# Patient Record
Sex: MALE | Race: BLACK OR AFRICAN AMERICAN | NOT HISPANIC OR LATINO | URBAN - METROPOLITAN AREA
[De-identification: names, ages, dates, MRNs, and addresses within clinical notes are randomized per-mention and may not be internally consistent; named-entity substitution may affect disease eponyms.]

---

## 2018-04-18 PROBLEM — 91175000 SEIZURE: Status: ACTIVE | Noted: 2018-04-18

## 2021-01-29 ENCOUNTER — OFFICE VISIT (OUTPATIENT)
Dept: URBAN - METROPOLITAN AREA CLINIC 13 | Facility: CLINIC | Age: 41
End: 2021-01-29

## 2021-02-05 ENCOUNTER — OFFICE VISIT (OUTPATIENT)
Dept: URBAN - METROPOLITAN AREA CLINIC 46 | Facility: CLINIC | Age: 41
End: 2021-02-05

## 2021-08-28 ENCOUNTER — TELEPHONE ENCOUNTER (OUTPATIENT)
Dept: URBAN - METROPOLITAN AREA CLINIC 13 | Facility: CLINIC | Age: 41
End: 2021-08-28

## 2021-08-28 RX ORDER — EXTENDED PHENYTOIN SODIUM 30 MG/1
CAPSULE ORAL
OUTPATIENT
End: 2019-07-09

## 2021-08-28 RX ORDER — LORAZEPAM 0.5 MG/1
TABLET ORAL
OUTPATIENT
End: 2019-07-09

## 2021-08-28 RX ORDER — LORAZEPAM 2 MG/ML
CONCENTRATE ORAL
OUTPATIENT
End: 2021-01-29

## 2021-08-29 ENCOUNTER — TELEPHONE ENCOUNTER (OUTPATIENT)
Dept: URBAN - METROPOLITAN AREA CLINIC 13 | Facility: CLINIC | Age: 41
End: 2021-08-29

## 2021-08-29 RX ORDER — LEVETIRACETAM 100 MG/ML
SOLUTION ORAL
Status: ACTIVE | COMMUNITY

## 2021-08-29 RX ORDER — MAGNESIUM OXIDE 500 MG
TABLET ORAL
Status: ACTIVE | COMMUNITY

## 2023-03-03 ENCOUNTER — OFFICE VISIT (OUTPATIENT)
Dept: URBAN - METROPOLITAN AREA CLINIC 46 | Facility: CLINIC | Age: 43
End: 2023-03-03
Payer: MEDICAID

## 2023-03-03 ENCOUNTER — TELEPHONE ENCOUNTER (OUTPATIENT)
Dept: URBAN - METROPOLITAN AREA CLINIC 46 | Facility: CLINIC | Age: 43
End: 2023-03-03

## 2023-03-03 VITALS
DIASTOLIC BLOOD PRESSURE: 99 MMHG | WEIGHT: 240 LBS | TEMPERATURE: 98.3 F | SYSTOLIC BLOOD PRESSURE: 137 MMHG | BODY MASS INDEX: 30.8 KG/M2 | OXYGEN SATURATION: 98 % | HEIGHT: 74 IN | HEART RATE: 90 BPM

## 2023-03-03 DIAGNOSIS — Z93.1 GASTROINTESTINAL TUBE IN SITU: ICD-10-CM

## 2023-03-03 PROBLEM — 440419004: Status: ACTIVE | Noted: 2023-03-03

## 2023-03-03 PROCEDURE — 99204 OFFICE O/P NEW MOD 45 MIN: CPT | Performed by: INTERNAL MEDICINE

## 2023-03-03 RX ORDER — MAGNESIUM OXIDE 500 MG
1 TABLET 30 MINUTES BEFORE BEDTIME AS NEEDED TABLET ORAL ONCE A DAY
Status: ACTIVE | COMMUNITY

## 2023-03-03 RX ORDER — LACTOSE-REDUCED FOOD
AS DIRECTED LIQUID (ML) ORAL
Status: ACTIVE | COMMUNITY

## 2023-03-03 RX ORDER — LEVETIRACETAM 250 MG/1
1 TABLET TABLET, FILM COATED ORAL TWICE A DAY
Status: ACTIVE | COMMUNITY

## 2023-03-03 NOTE — PHYSICAL EXAM GASTROINTESTINAL
Abdomen , soft, nontender, nondistended , no guarding or rigidity , no masses palpable , normal bowel sounds , Liver and Spleen,  no hepatosplenomegaly , liver nontender.  G tube site without erythema and bumper at 4cm

## 2023-05-12 ENCOUNTER — OFFICE VISIT (OUTPATIENT)
Dept: URBAN - METROPOLITAN AREA CLINIC 46 | Facility: CLINIC | Age: 43
End: 2023-05-12

## 2023-05-12 RX ORDER — LACTOSE-REDUCED FOOD
AS DIRECTED LIQUID (ML) ORAL
Status: ACTIVE | COMMUNITY

## 2023-05-12 RX ORDER — LEVETIRACETAM 250 MG/1
1 TABLET TABLET, FILM COATED ORAL TWICE A DAY
Status: ACTIVE | COMMUNITY

## 2023-05-12 RX ORDER — MAGNESIUM OXIDE 500 MG
1 TABLET 30 MINUTES BEFORE BEDTIME AS NEEDED TABLET ORAL ONCE A DAY
Status: ACTIVE | COMMUNITY

## 2023-05-19 ENCOUNTER — OFFICE VISIT (OUTPATIENT)
Dept: URBAN - METROPOLITAN AREA CLINIC 46 | Facility: CLINIC | Age: 43
End: 2023-05-19

## 2023-05-19 RX ORDER — LEVETIRACETAM 250 MG/1
1 TABLET TABLET, FILM COATED ORAL TWICE A DAY
Status: ACTIVE | COMMUNITY

## 2023-05-19 RX ORDER — LACTOSE-REDUCED FOOD
AS DIRECTED LIQUID (ML) ORAL
Status: ACTIVE | COMMUNITY

## 2023-05-19 RX ORDER — MAGNESIUM OXIDE 500 MG
1 TABLET 30 MINUTES BEFORE BEDTIME AS NEEDED TABLET ORAL ONCE A DAY
Status: ACTIVE | COMMUNITY

## 2024-01-25 ENCOUNTER — TELEPHONE ENCOUNTER (OUTPATIENT)
Dept: URBAN - METROPOLITAN AREA CLINIC 46 | Facility: CLINIC | Age: 44
End: 2024-01-25

## 2024-01-29 ENCOUNTER — DASHBOARD ENCOUNTERS (OUTPATIENT)
Age: 44
End: 2024-01-29

## 2024-01-30 ENCOUNTER — OFFICE VISIT (OUTPATIENT)
Dept: URBAN - METROPOLITAN AREA CLINIC 44 | Facility: CLINIC | Age: 44
End: 2024-01-30
Payer: MEDICAID

## 2024-01-30 VITALS
HEIGHT: 74 IN | WEIGHT: 245 LBS | TEMPERATURE: 98.4 F | SYSTOLIC BLOOD PRESSURE: 133 MMHG | BODY MASS INDEX: 31.44 KG/M2 | HEART RATE: 94 BPM | DIASTOLIC BLOOD PRESSURE: 89 MMHG

## 2024-01-30 DIAGNOSIS — K94.23 PEG TUBE MALFUNCTION: ICD-10-CM

## 2024-01-30 PROCEDURE — 99202 OFFICE O/P NEW SF 15 MIN: CPT | Performed by: INTERNAL MEDICINE

## 2024-01-30 RX ORDER — TRAZODONE HYDROCHLORIDE 50 MG/1
1 TABLET AT BEDTIME AS NEEDED TABLET ORAL ONCE A DAY
Status: ACTIVE | COMMUNITY

## 2024-01-30 RX ORDER — LACTOSE-REDUCED FOOD
AS DIRECTED LIQUID (ML) ORAL
Status: ACTIVE | COMMUNITY

## 2024-01-30 RX ORDER — LEVETIRACETAM 250 MG/1
1 TABLET TABLET, FILM COATED ORAL TWICE A DAY
Status: ACTIVE | COMMUNITY

## 2024-01-30 RX ORDER — MAGNESIUM OXIDE 500 MG
1 TABLET 30 MINUTES BEFORE BEDTIME AS NEEDED TABLET ORAL ONCE A DAY
Status: ACTIVE | COMMUNITY

## 2024-01-30 NOTE — PHYSICAL EXAM GASTROINTESTINAL
PEG tube in place.  Nondistended , no guarding or rigidity , no masses palpable , normal bowel sounds , Liver and Spleen,  no hepatosplenomegaly , liver nontender
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
## 2024-03-04 ENCOUNTER — OV EP (OUTPATIENT)
Dept: URBAN - METROPOLITAN AREA CLINIC 48 | Facility: CLINIC | Age: 44
End: 2024-03-04